# Patient Record
Sex: MALE | Race: OTHER
[De-identification: names, ages, dates, MRNs, and addresses within clinical notes are randomized per-mention and may not be internally consistent; named-entity substitution may affect disease eponyms.]

---

## 2021-04-23 ENCOUNTER — HOSPITAL ENCOUNTER (OUTPATIENT)
Dept: HOSPITAL 63 - RAD | Age: 31
End: 2021-04-23
Attending: NURSE PRACTITIONER
Payer: OTHER GOVERNMENT

## 2021-04-23 DIAGNOSIS — M25.531: Primary | ICD-10-CM

## 2021-04-23 PROCEDURE — 73110 X-RAY EXAM OF WRIST: CPT

## 2021-04-23 NOTE — RAD
EXAM: Right wrist, 3 views.



HISTORY: Pain.



COMPARISON: None.



FINDINGS: 3 views of the right wrist are obtained. There is no fracture, dislocation or subluxation. 
There is a suspected cyst within the lunate.



IMPRESSION: No acute osseous finding. Suspected intraosseous cyst or degenerative subchondral cyst wi
thin the lunate. No osteonecrosis is seen.



Electronically signed by: Divine Matamoros MD (4/23/2021 5:06 PM) [id number]